# Patient Record
Sex: FEMALE | URBAN - METROPOLITAN AREA
[De-identification: names, ages, dates, MRNs, and addresses within clinical notes are randomized per-mention and may not be internally consistent; named-entity substitution may affect disease eponyms.]

---

## 2024-02-13 ENCOUNTER — NURSING HOME VISIT (OUTPATIENT)
Dept: POST ACUTE CARE | Facility: EXTERNAL LOCATION | Age: 89
End: 2024-02-13
Payer: MEDICARE

## 2024-02-13 DIAGNOSIS — G30.9 ALZHEIMER'S DISEASE (MULTI): ICD-10-CM

## 2024-02-13 DIAGNOSIS — M62.81 MUSCLE WEAKNESS: ICD-10-CM

## 2024-02-13 DIAGNOSIS — F02.80 ALZHEIMER'S DISEASE (MULTI): ICD-10-CM

## 2024-02-13 DIAGNOSIS — S72.001D FRACTURE OF UNSPECIFIED PART OF NECK OF RIGHT FEMUR, SUBSEQUENT ENCOUNTER FOR CLOSED FRACTURE WITH ROUTINE HEALING: Primary | ICD-10-CM

## 2024-02-13 PROCEDURE — 99308 SBSQ NF CARE LOW MDM 20: CPT | Performed by: REGISTERED NURSE

## 2024-02-13 NOTE — LETTER
Patient: Austin Hernandez  : 1935    Encounter Date: 2024    PROGRESS NOTE    Subjective  Chief complaint: Austin Hernandez is a 88 y.o. female who is an acute skilled patient being seen and evaluated for weakness    HPI:  24 Patient admitted to SNF for therapy d/t weakness after recent hospitalization.   Patient requires assist with ADLs and transfers.  No new complaints.        Objective  Vital signs: 136/80, 97.3, 71, 18, 98%    Physical Exam  Constitutional:       General: She is not in acute distress.  Eyes:      Extraocular Movements: Extraocular movements intact.   Pulmonary:      Effort: Pulmonary effort is normal.   Musculoskeletal:      Cervical back: Neck supple.   Neurological:      Mental Status: She is alert.   Psychiatric:         Mood and Affect: Mood normal.         Behavior: Behavior is cooperative.         Assessment/Plan  Problem List Items Addressed This Visit       Fracture of unspecified part of neck of right femur, subsequent encounter for closed fracture with routine healing - Primary     Pt/ot   Pain mgmt          Muscle weakness     Pt/ot          Alzheimer's disease (CMS/HCC)     Mentation at baseline   Monitor   Supportive care           Medications, treatments, and labs reviewed  Continue medications and treatments as listed in [facility]    Scribe Attestation  I, Guicho Redman   attest that this documentation has been prepared under the direction and in the presence of BEV Barajas.    Provider Attestation - Scribe documentation  All medical record entries made by the Scribe were at my direction and personally dictated by me. I have reviewed the chart and agree that the record accurately reflects my personal performance of the history, physical exam, discussion and plan.    BEV Barajas        Electronically Signed By: BEV Barajas   24  7:12 PM

## 2024-02-14 NOTE — PROGRESS NOTES
PROGRESS NOTE    Subjective   Chief complaint: Austin Hernandez is a 88 y.o. female who is an acute skilled patient being seen and evaluated for weakness    HPI:  2/13/24 Patient admitted to SNF for therapy d/t weakness after recent hospitalization.   Patient requires assist with ADLs and transfers.  No new complaints.        Objective   Vital signs: 136/80, 97.3, 71, 18, 98%    Physical Exam  Constitutional:       General: She is not in acute distress.  Eyes:      Extraocular Movements: Extraocular movements intact.   Pulmonary:      Effort: Pulmonary effort is normal.   Musculoskeletal:      Cervical back: Neck supple.   Neurological:      Mental Status: She is alert.   Psychiatric:         Mood and Affect: Mood normal.         Behavior: Behavior is cooperative.         Assessment/Plan   Problem List Items Addressed This Visit       Fracture of unspecified part of neck of right femur, subsequent encounter for closed fracture with routine healing - Primary     Pt/ot   Pain mgmt          Muscle weakness     Pt/ot          Alzheimer's disease (CMS/[facility])     Mentation at baseline   Monitor   Supportive care           Medications, treatments, and labs reviewed  Continue medications and treatments as listed in [facility]    Scribe Attestation  I, Guicho Redman   attest that this documentation has been prepared under the direction and in the presence of BEV Barajas.    Provider Attestation - Scribe documentation  All medical record entries made by the Scribe were at my direction and personally dictated by me. I have reviewed the chart and agree that the record accurately reflects my personal performance of the history, physical exam, discussion and plan.    BEV Barajas

## 2024-02-27 PROBLEM — S72.001D FRACTURE OF UNSPECIFIED PART OF NECK OF RIGHT FEMUR, SUBSEQUENT ENCOUNTER FOR CLOSED FRACTURE WITH ROUTINE HEALING: Status: ACTIVE | Noted: 2024-02-27

## 2024-02-27 PROBLEM — F02.80 ALZHEIMER'S DISEASE (MULTI): Status: ACTIVE | Noted: 2024-02-27

## 2024-02-27 PROBLEM — G30.9 ALZHEIMER'S DISEASE (MULTI): Status: ACTIVE | Noted: 2024-02-27

## 2024-02-27 PROBLEM — M62.81 MUSCLE WEAKNESS: Status: ACTIVE | Noted: 2024-02-27

## 2024-02-28 ENCOUNTER — NURSING HOME VISIT (OUTPATIENT)
Dept: POST ACUTE CARE | Facility: EXTERNAL LOCATION | Age: 89
End: 2024-02-28
Payer: MEDICARE

## 2024-02-28 DIAGNOSIS — I10 PRIMARY HYPERTENSION: ICD-10-CM

## 2024-02-28 DIAGNOSIS — G30.9 ALZHEIMER'S DISEASE (MULTI): Primary | ICD-10-CM

## 2024-02-28 DIAGNOSIS — M62.81 MUSCLE WEAKNESS: ICD-10-CM

## 2024-02-28 DIAGNOSIS — M62.838 MUSCLE SPASM: ICD-10-CM

## 2024-02-28 DIAGNOSIS — S72.001D FRACTURE OF UNSPECIFIED PART OF NECK OF RIGHT FEMUR, SUBSEQUENT ENCOUNTER FOR CLOSED FRACTURE WITH ROUTINE HEALING: ICD-10-CM

## 2024-02-28 DIAGNOSIS — F02.80 ALZHEIMER'S DISEASE (MULTI): Primary | ICD-10-CM

## 2024-02-28 PROCEDURE — 99309 SBSQ NF CARE MODERATE MDM 30: CPT | Performed by: REGISTERED NURSE

## 2024-02-28 NOTE — LETTER
Patient: Austin Hernandez  : 1935    Encounter Date: 2024    PROGRESS NOTE    Subjective  Chief complaint: Austin Hernandez is a 88 y.o. female patient being seen and evaluated for monthly general medical care and follow-up    HPI:  24 Patient presents for general medical care and f/u.  Patient seen and examined at bedside.  No issues per nursing.  Patient has no acute complaints.          Objective  Vital signs: 136/80, 97.3, 71, 18, 98%    Physical Exam  Constitutional:       General: She is not in acute distress.  Eyes:      Extraocular Movements: Extraocular movements intact.   Pulmonary:      Effort: Pulmonary effort is normal.   Musculoskeletal:      Cervical back: Neck supple.   Neurological:      Mental Status: She is alert.   Psychiatric:         Mood and Affect: Mood normal.         Behavior: Behavior is cooperative.         Assessment/Plan  Problem List Items Addressed This Visit       Fracture of unspecified part of neck of right femur, subsequent encounter for closed fracture with routine healing     Encourage patient to ask for assistance  Pain mgmt          Muscle weakness     Encourage patient to ask for assistance         Alzheimer's disease (CMS/[facility]) - Primary     Mentation at baseline   Monitor   Supportive care          Muscle spasm     Cyclobenzaprine         Hypertension     Nifedipine          Medications, treatments, and labs reviewed  Continue medications and treatments as listed in EMR    Scribe Attestation  IRadha Scribe   attest that this documentation has been prepared under the direction and in the presence of BEV Barajas.    Provider Attestation - Scribe documentation  All medical record entries made by the Scribe were at my direction and personally dictated by me. I have reviewed the chart and agree that the record accurately reflects my personal performance of the history, physical exam, discussion and plan.    Shola Linn  APRN-CNP      Electronically Signed By: BEV Barajas   3/21/24 10:04 AM

## 2024-02-29 NOTE — PROGRESS NOTES
PROGRESS NOTE    Subjective   Chief complaint: Austin Hernandez is a 88 y.o. female patient being seen and evaluated for monthly general medical care and follow-up    HPI:  2/28/24 Patient presents for general medical care and f/u.  Patient seen and examined at bedside.  No issues per nursing.  Patient has no acute complaints.          Objective   Vital signs: 136/80, 97.3, 71, 18, 98%    Physical Exam  Constitutional:       General: She is not in acute distress.  Eyes:      Extraocular Movements: Extraocular movements intact.   Pulmonary:      Effort: Pulmonary effort is normal.   Musculoskeletal:      Cervical back: Neck supple.   Neurological:      Mental Status: She is alert.   Psychiatric:         Mood and Affect: Mood normal.         Behavior: Behavior is cooperative.         Assessment/Plan   Problem List Items Addressed This Visit       Fracture of unspecified part of neck of right femur, subsequent encounter for closed fracture with routine healing     Encourage patient to ask for assistance  Pain mgmt          Muscle weakness     Encourage patient to ask for assistance         Alzheimer's disease (CMS/[facility]) - Primary     Mentation at baseline   Monitor   Supportive care          Muscle spasm     Cyclobenzaprine         Hypertension     Nifedipine          Medications, treatments, and labs reviewed  Continue medications and treatments as listed in EMR    Scribe Attestation  I, Guicho Redman   attest that this documentation has been prepared under the direction and in the presence of BEV Barajas.    Provider Attestation - Scribe documentation  All medical record entries made by the Scribe were at my direction and personally dictated by me. I have reviewed the chart and agree that the record accurately reflects my personal performance of the history, physical exam, discussion and plan.    BEV Barajas

## 2024-03-21 PROBLEM — M62.838 MUSCLE SPASM: Status: ACTIVE | Noted: 2024-03-21

## 2024-03-21 PROBLEM — I10 HYPERTENSION: Status: ACTIVE | Noted: 2024-03-21

## 2024-03-26 ENCOUNTER — NURSING HOME VISIT (OUTPATIENT)
Dept: POST ACUTE CARE | Facility: EXTERNAL LOCATION | Age: 89
End: 2024-03-26
Payer: MEDICARE

## 2024-03-26 DIAGNOSIS — G30.9 ALZHEIMER'S DISEASE (MULTI): ICD-10-CM

## 2024-03-26 DIAGNOSIS — F02.80 ALZHEIMER'S DISEASE (MULTI): ICD-10-CM

## 2024-03-26 DIAGNOSIS — I10 PRIMARY HYPERTENSION: ICD-10-CM

## 2024-03-26 DIAGNOSIS — M62.838 MUSCLE SPASM: Primary | ICD-10-CM

## 2024-03-26 DIAGNOSIS — S72.001D FRACTURE OF UNSPECIFIED PART OF NECK OF RIGHT FEMUR, SUBSEQUENT ENCOUNTER FOR CLOSED FRACTURE WITH ROUTINE HEALING: ICD-10-CM

## 2024-03-26 PROCEDURE — 99309 SBSQ NF CARE MODERATE MDM 30: CPT | Performed by: REGISTERED NURSE

## 2024-03-26 NOTE — LETTER
Patient: Austin Hernandez  : 1935    Encounter Date: 2024    PROGRESS NOTE    Subjective  Chief complaint: Austin Hernandez is a 88 y.o. female patient being seen and evaluated for monthly general medical care and follow-up    HPI:  3/26/24 Patient presents for general medical care and f/u.  Patient seen and examined at bedside.  No issues per nursing.  Patient has no acute complaints.          Objective  Vital signs: 136/80, 97.3, 71, 18, 98%    Physical Exam  Constitutional:       General: She is not in acute distress.  Eyes:      Extraocular Movements: Extraocular movements intact.   Pulmonary:      Effort: Pulmonary effort is normal.   Musculoskeletal:      Cervical back: Neck supple.   Neurological:      Mental Status: She is alert.   Psychiatric:         Mood and Affect: Mood normal.         Behavior: Behavior is cooperative.         Assessment/Plan  Problem List Items Addressed This Visit       Fracture of unspecified part of neck of right femur, subsequent encounter for closed fracture with routine healing     Encourage patient to ask for assistance  Pain mgmt          Alzheimer's disease (Multi)     Mentation at baseline   Monitor   Supportive care          Muscle spasm - Primary     Cyclobenzaprine         Hypertension     Nifedipine          Medications, treatments, and labs reviewed  Continue medications and treatments as listed in EMR    Scribe Attestation  IRadha Scribe   attest that this documentation has been prepared under the direction and in the presence of BEV Barajas.    Provider Attestation - Scribe documentation  All medical record entries made by the Scribe were at my direction and personally dictated by me. I have reviewed the chart and agree that the record accurately reflects my personal performance of the history, physical exam, discussion and plan.    BEV Barajas      Electronically Signed By: Shola Linn  TSERING-CNP   4/18/24  1:24 PM

## 2024-03-27 NOTE — PROGRESS NOTES
PROGRESS NOTE    Subjective   Chief complaint: Austin Hernandez is a 88 y.o. female patient being seen and evaluated for monthly general medical care and follow-up    HPI:  3/26/24 Patient presents for general medical care and f/u.  Patient seen and examined at bedside.  No issues per nursing.  Patient has no acute complaints.          Objective   Vital signs: 136/80, 97.3, 71, 18, 98%    Physical Exam  Constitutional:       General: She is not in acute distress.  Eyes:      Extraocular Movements: Extraocular movements intact.   Pulmonary:      Effort: Pulmonary effort is normal.   Musculoskeletal:      Cervical back: Neck supple.   Neurological:      Mental Status: She is alert.   Psychiatric:         Mood and Affect: Mood normal.         Behavior: Behavior is cooperative.         Assessment/Plan   Problem List Items Addressed This Visit       Fracture of unspecified part of neck of right femur, subsequent encounter for closed fracture with routine healing     Encourage patient to ask for assistance  Pain mgmt          Alzheimer's disease (Multi)     Mentation at baseline   Monitor   Supportive care          Muscle spasm - Primary     Cyclobenzaprine         Hypertension     Nifedipine          Medications, treatments, and labs reviewed  Continue medications and treatments as listed in EMR    Scribe Attestation  IRadha Scribe   attest that this documentation has been prepared under the direction and in the presence of BEV Barajas.    Provider Attestation - Scribe documentation  All medical record entries made by the Scribe were at my direction and personally dictated by me. I have reviewed the chart and agree that the record accurately reflects my personal performance of the history, physical exam, discussion and plan.    BEV Barajas

## 2024-04-12 ENCOUNTER — NURSING HOME VISIT (OUTPATIENT)
Dept: POST ACUTE CARE | Facility: EXTERNAL LOCATION | Age: 89
End: 2024-04-12
Payer: MEDICARE

## 2024-04-12 DIAGNOSIS — F02.80 ALZHEIMER'S DISEASE (MULTI): Primary | ICD-10-CM

## 2024-04-12 DIAGNOSIS — G30.9 ALZHEIMER'S DISEASE (MULTI): Primary | ICD-10-CM

## 2024-04-12 PROCEDURE — 99308 SBSQ NF CARE LOW MDM 20: CPT | Performed by: REGISTERED NURSE

## 2024-04-12 NOTE — LETTER
Patient: Austin Hernandez  : 1935    Encounter Date: 2024    PROGRESS NOTE    Subjective  Chief complaint: Austin Hernandez is a 88 y.o. female who is a long term care patient being seen and evaluated for paperwork.    HPI:  HPI pt seen today for paperwork that needed completion for facility   Objective  Vital signs: 136/80, 97.3, 71, 18, 98%    Physical Exam  Constitutional:       General: She is not in acute distress.  Eyes:      Extraocular Movements: Extraocular movements intact.   Pulmonary:      Effort: Pulmonary effort is normal.   Musculoskeletal:      Cervical back: Neck supple.   Neurological:      Mental Status: She is alert.   Psychiatric:         Mood and Affect: Mood normal.         Behavior: Behavior is cooperative.       Assessment/Plan  Problem List Items Addressed This Visit       Alzheimer's disease (Multi) - Primary     Mentation at baseline   Monitor   Supportive care     Paperwork completed spent aprx 15 min to complete          Medications, treatments, and labs reviewed  Continue medications and treatments as listed in [facility]    Scribe Attestation  IRadha Scribe   attest that this documentation has been prepared under the direction and in the presence of BEV Barajas.    Provider Attestation - Scribe documentation  All medical record entries made by the Scribe were at my direction and personally dictated by me. I have reviewed the chart and agree that the record accurately reflects my personal performance of the history, physical exam, discussion and plan.    BEV Barajas          Electronically Signed By: BEV Barajas   24  5:14 PM

## 2024-04-15 NOTE — PROGRESS NOTES
PROGRESS NOTE    Subjective   Chief complaint: Austin Hernandez is a 88 y.o. female who is a long term care patient being seen and evaluated for paperwork.    HPI:  HPI pt seen today for paperwork that needed completion for facility   Objective   Vital signs: 136/80, 97.3, 71, 18, 98%    Physical Exam  Constitutional:       General: She is not in acute distress.  Eyes:      Extraocular Movements: Extraocular movements intact.   Pulmonary:      Effort: Pulmonary effort is normal.   Musculoskeletal:      Cervical back: Neck supple.   Neurological:      Mental Status: She is alert.   Psychiatric:         Mood and Affect: Mood normal.         Behavior: Behavior is cooperative.       Assessment/Plan   Problem List Items Addressed This Visit       Alzheimer's disease (Multi) - Primary     Mentation at baseline   Monitor   Supportive care     Paperwork completed spent aprx 15 min to complete          Medications, treatments, and labs reviewed  Continue medications and treatments as listed in [facility]    Scribe Attestation  Radha HERRERA Scribe   attest that this documentation has been prepared under the direction and in the presence of BEV Barajas.    Provider Attestation - Scribe documentation  All medical record entries made by the Scribe were at my direction and personally dictated by me. I have reviewed the chart and agree that the record accurately reflects my personal performance of the history, physical exam, discussion and plan.    BEV Barajas

## 2024-04-22 ENCOUNTER — NURSING HOME VISIT (OUTPATIENT)
Dept: POST ACUTE CARE | Facility: EXTERNAL LOCATION | Age: 89
End: 2024-04-22
Payer: MEDICARE

## 2024-04-22 DIAGNOSIS — F02.80 ALZHEIMER'S DISEASE (MULTI): Primary | ICD-10-CM

## 2024-04-22 DIAGNOSIS — E78.2 MIXED HYPERLIPIDEMIA: ICD-10-CM

## 2024-04-22 DIAGNOSIS — I10 PRIMARY HYPERTENSION: ICD-10-CM

## 2024-04-22 DIAGNOSIS — K59.01 SLOW TRANSIT CONSTIPATION: ICD-10-CM

## 2024-04-22 DIAGNOSIS — G30.9 ALZHEIMER'S DISEASE (MULTI): Primary | ICD-10-CM

## 2024-04-22 PROCEDURE — 99308 SBSQ NF CARE LOW MDM 20: CPT

## 2024-04-22 NOTE — LETTER
Patient: Austin Hernandez  : 1935    Encounter Date: 2024    PROGRESS NOTE    Subjective  Chief complaint: Austin Hernandez is a 88 y.o. female who is a long term care patient being seen and evaluated for medical care follow up.    HPI:  HPI  Is for general medical care and follow-up.  Patient seen and examined at bedside.  Patient.  Patient has no acute complaints at this time.  Denies N/V/F/C.    Objective  Vital signs: 0.3, 136/80, 71, 98% room air.    Physical Exam  Vitals reviewed.   Constitutional:       General: She is not in acute distress.  Eyes:      Extraocular Movements: Extraocular movements intact.   Cardiovascular:      Rate and Rhythm: Normal rate and regular rhythm.   Pulmonary:      Effort: Pulmonary effort is normal.      Breath sounds: Normal breath sounds.   Abdominal:      General: Bowel sounds are normal.      Palpations: Abdomen is soft.   Musculoskeletal:      Cervical back: Neck supple.      Right lower leg: No edema.      Left lower leg: No edema.   Neurological:      Mental Status: She is alert.   Psychiatric:         Mood and Affect: Mood normal.         Behavior: Behavior is cooperative.         Assessment/Plan  Problem List Items Addressed This Visit       Alzheimer's disease (Multi) - Primary     Mentation at baseline   Divalproex  Supportive care          Hypertension     Well controlled  HCTz  Nifedipine           Slow transit constipation     Dulcolax  Milk of Magnesia           Mixed hyperlipidemia     Medications, treatments, and labs reviewed  Continue medications and treatments as listed in EMR    BEV Echeverria      Electronically Signed By: BEV Echeverria   24  8:50 PM

## 2024-04-23 PROBLEM — E78.2 MIXED HYPERLIPIDEMIA: Status: ACTIVE | Noted: 2024-04-23

## 2024-04-23 PROBLEM — K59.01 SLOW TRANSIT CONSTIPATION: Status: ACTIVE | Noted: 2024-04-23

## 2024-04-23 PROBLEM — S72.001D FRACTURE OF UNSPECIFIED PART OF NECK OF RIGHT FEMUR, SUBSEQUENT ENCOUNTER FOR CLOSED FRACTURE WITH ROUTINE HEALING: Status: RESOLVED | Noted: 2024-02-27 | Resolved: 2024-04-23

## 2024-04-24 NOTE — PROGRESS NOTES
PROGRESS NOTE    Subjective   Chief complaint: Austin Hernandez is a 88 y.o. female who is a long term care patient being seen and evaluated for medical care follow up.    HPI:  HPI  Is for general medical care and follow-up.  Patient seen and examined at bedside.  Patient.  Patient has no acute complaints at this time.  Denies N/V/F/C.    Objective   Vital signs: 0.3, 136/80, 71, 98% room air.    Physical Exam  Vitals reviewed.   Constitutional:       General: She is not in acute distress.  Eyes:      Extraocular Movements: Extraocular movements intact.   Cardiovascular:      Rate and Rhythm: Normal rate and regular rhythm.   Pulmonary:      Effort: Pulmonary effort is normal.      Breath sounds: Normal breath sounds.   Abdominal:      General: Bowel sounds are normal.      Palpations: Abdomen is soft.   Musculoskeletal:      Cervical back: Neck supple.      Right lower leg: No edema.      Left lower leg: No edema.   Neurological:      Mental Status: She is alert.   Psychiatric:         Mood and Affect: Mood normal.         Behavior: Behavior is cooperative.         Assessment/Plan   Problem List Items Addressed This Visit       Alzheimer's disease (Multi) - Primary     Mentation at baseline   Divalproex  Supportive care          Hypertension     Well controlled  HCTz  Nifedipine           Slow transit constipation     Dulcolax  Milk of Magnesia           Mixed hyperlipidemia     Medications, treatments, and labs reviewed  Continue medications and treatments as listed in EMR    Esha Callejas, TSERING-CNP

## 2024-05-07 NOTE — ASSESSMENT & PLAN NOTE
Mentation at baseline   Monitor   Supportive care     Paperwork completed spent aprx 15 min to complete

## 2024-05-17 ENCOUNTER — NURSING HOME VISIT (OUTPATIENT)
Dept: POST ACUTE CARE | Facility: EXTERNAL LOCATION | Age: 89
End: 2024-05-17
Payer: MEDICARE

## 2024-05-17 DIAGNOSIS — M62.81 MUSCLE WEAKNESS: Primary | ICD-10-CM

## 2024-05-17 DIAGNOSIS — G30.9 ALZHEIMER'S DISEASE (MULTI): ICD-10-CM

## 2024-05-17 DIAGNOSIS — M62.838 MUSCLE SPASM: ICD-10-CM

## 2024-05-17 DIAGNOSIS — F02.80 ALZHEIMER'S DISEASE (MULTI): ICD-10-CM

## 2024-05-17 DIAGNOSIS — I10 PRIMARY HYPERTENSION: ICD-10-CM

## 2024-05-17 PROCEDURE — 99309 SBSQ NF CARE MODERATE MDM 30: CPT | Performed by: REGISTERED NURSE

## 2024-05-17 NOTE — LETTER
Patient: Austin Hernandez  : 1935    Encounter Date: 2024    PROGRESS NOTE    Subjective  Chief complaint: Austin Hernandez is a 89 y.o. female patient being seen and evaluated for monthly general medical care and follow-up    HPI:  24 Patient presents for general medical care and f/u.  Patient seen and examined at bedside.  No issues per nursing.  Patient has no acute complaints.        Objective  Vital signs: 136/80, 97.3, 71, 18, 98%    Physical Exam  Constitutional:       General: She is not in acute distress.  Eyes:      Extraocular Movements: Extraocular movements intact.   Pulmonary:      Effort: Pulmonary effort is normal.   Musculoskeletal:      Cervical back: Neck supple.   Neurological:      Mental Status: She is alert.   Psychiatric:         Mood and Affect: Mood normal.         Behavior: Behavior is cooperative.         Assessment/Plan  Problem List Items Addressed This Visit       Muscle weakness - Primary     Encourage patient to ask for assistance         Alzheimer's disease (Multi)     Mentation at baseline   Monitor   Supportive care              Muscle spasm     Cyclobenzaprine         Hypertension     Nifedipine          Medications, treatments, and labs reviewed  Continue medications and treatments as listed in EMR    Scribe Attestation  IRadha Scribe   attest that this documentation has been prepared under the direction and in the presence of BEV Barajas.    Provider Attestation - Scribe documentation  All medical record entries made by the Scribe were at my direction and personally dictated by me. I have reviewed the chart and agree that the record accurately reflects my personal performance of the history, physical exam, discussion and plan.    BEV Barajas      Electronically Signed By: BEV Barajas   24  2:38 PM

## 2024-05-20 NOTE — PROGRESS NOTES
PROGRESS NOTE    Subjective   Chief complaint: Austin Hernandez is a 89 y.o. female patient being seen and evaluated for monthly general medical care and follow-up    HPI:  5/17/24 Patient presents for general medical care and f/u.  Patient seen and examined at bedside.  No issues per nursing.  Patient has no acute complaints.        Objective   Vital signs: 136/80, 97.3, 71, 18, 98%    Physical Exam  Constitutional:       General: She is not in acute distress.  Eyes:      Extraocular Movements: Extraocular movements intact.   Pulmonary:      Effort: Pulmonary effort is normal.   Musculoskeletal:      Cervical back: Neck supple.   Neurological:      Mental Status: She is alert.   Psychiatric:         Mood and Affect: Mood normal.         Behavior: Behavior is cooperative.         Assessment/Plan   Problem List Items Addressed This Visit       Muscle weakness - Primary     Encourage patient to ask for assistance         Alzheimer's disease (Multi)     Mentation at baseline   Monitor   Supportive care              Muscle spasm     Cyclobenzaprine         Hypertension     Nifedipine          Medications, treatments, and labs reviewed  Continue medications and treatments as listed in EMR    Scribe Attestation  IRadha Scribe   attest that this documentation has been prepared under the direction and in the presence of BEV Barajas.    Provider Attestation - Scribe documentation  All medical record entries made by the Scribe were at my direction and personally dictated by me. I have reviewed the chart and agree that the record accurately reflects my personal performance of the history, physical exam, discussion and plan.    BEV Barajas     Initial (On Arrival)

## 2024-09-25 ENCOUNTER — NURSING HOME VISIT (OUTPATIENT)
Dept: POST ACUTE CARE | Facility: EXTERNAL LOCATION | Age: 89
End: 2024-09-25
Payer: COMMERCIAL

## 2024-09-25 DIAGNOSIS — G30.9 ALZHEIMER'S DISEASE: Primary | ICD-10-CM

## 2024-09-25 DIAGNOSIS — M62.838 MUSCLE SPASM: ICD-10-CM

## 2024-09-25 DIAGNOSIS — I10 PRIMARY HYPERTENSION: ICD-10-CM

## 2024-09-25 DIAGNOSIS — M62.81 MUSCLE WEAKNESS: ICD-10-CM

## 2024-09-25 DIAGNOSIS — F02.80 ALZHEIMER'S DISEASE: Primary | ICD-10-CM

## 2024-09-25 PROCEDURE — 99309 SBSQ NF CARE MODERATE MDM 30: CPT | Performed by: REGISTERED NURSE

## 2024-09-25 NOTE — LETTER
Patient: Austin Hernandez  : 1935    Encounter Date: 2024    PROGRESS NOTE    Subjective  Chief complaint: Austin Hernandez is a 89 y.o. female patient being seen and evaluated for monthly general medical care and follow-up    HPI:  Patient presents for general medical care and f/u.  Patient seen and examined at bedside.  No issues per nursing.  Patient has no acute complaints.        Objective  Vital signs: 98, 74, 16, 99%    Physical Exam  Constitutional:       General: She is not in acute distress.  Eyes:      Extraocular Movements: Extraocular movements intact.   Pulmonary:      Effort: Pulmonary effort is normal.   Musculoskeletal:      Cervical back: Neck supple.   Neurological:      Mental Status: She is alert.   Psychiatric:         Mood and Affect: Mood normal.         Behavior: Behavior is cooperative.         Assessment/Plan  Problem List Items Addressed This Visit       Muscle weakness     Encourage patient to ask for assistance         Alzheimer's disease - Primary     Mentation at baseline   Monitor   Supportive care              Muscle spasm     Cyclobenzaprine         Hypertension     Nifedipine          Medications, treatments, and labs reviewed  Continue medications and treatments as listed in EMR    Scribe Attestation  I, Guicho Redman   attest that this documentation has been prepared under the direction and in the presence of BEV Barajas.    Provider Attestation - Scribe documentation  All medical record entries made by the Scribe were at my direction and personally dictated by me. I have reviewed the chart and agree that the record accurately reflects my personal performance of the history, physical exam, discussion and plan.    BEV Barajas      Electronically Signed By: BEV Barajas   10/2/24  4:27 PM

## 2024-10-30 ENCOUNTER — NURSING HOME VISIT (OUTPATIENT)
Dept: POST ACUTE CARE | Facility: EXTERNAL LOCATION | Age: 89
End: 2024-10-30
Payer: COMMERCIAL

## 2024-10-30 DIAGNOSIS — I10 PRIMARY HYPERTENSION: ICD-10-CM

## 2024-10-30 DIAGNOSIS — G30.9 ALZHEIMER'S DISEASE: Primary | ICD-10-CM

## 2024-10-30 DIAGNOSIS — E78.2 MIXED HYPERLIPIDEMIA: ICD-10-CM

## 2024-10-30 DIAGNOSIS — M62.838 MUSCLE SPASM: ICD-10-CM

## 2024-10-30 DIAGNOSIS — F02.80 ALZHEIMER'S DISEASE: Primary | ICD-10-CM

## 2024-10-30 PROCEDURE — 99309 SBSQ NF CARE MODERATE MDM 30: CPT | Performed by: INTERNAL MEDICINE

## 2024-10-30 NOTE — LETTER
Patient: Austin Hernandez  : 1935    Encounter Date: 10/30/2024    PROGRESS NOTE    Subjective  Chief complaint: Austin Hernandez is a 89 y.o. female patient being seen and evaluated for monthly general medical care and follow-up     HPI:  Patient is here for general medical care and his multi follow-up.  He has a past medical history of Alzheimer's dementia.  He is his normal self per nursing.  No issues.  He has a past medical history of hypertension.  His blood pressure is controlled for his age.  No chest pain or headaches.  He has a past medical history of hyperlipidemia.  Recent labs reviewed.  He is tolerating his statin.          Objective      Physical Exam  HENT:      Head: Normocephalic and atraumatic.      Nose: Nose normal.      Mouth/Throat:      Mouth: Mucous membranes are moist.      Pharynx: Oropharynx is clear.   Eyes:      Extraocular Movements: Extraocular movements intact.      Pupils: Pupils are equal, round, and reactive to light.   Cardiovascular:      Rate and Rhythm: Normal rate and regular rhythm.   Pulmonary:      Effort: No respiratory distress.      Breath sounds: No wheezing, rhonchi or rales.   Abdominal:      General: Bowel sounds are normal. There is no distension.      Palpations: Abdomen is soft.      Tenderness: There is no abdominal tenderness. There is no guarding.   Musculoskeletal:      Right lower leg: No edema.      Left lower leg: No edema.   Skin:     General: Skin is warm and dry.   Neurological:      Mental Status: She is alert. Mental status is at baseline.         Assessment/Plan  Problem List Items Addressed This Visit       Alzheimer's disease - Primary     Supportive care  Continue current medications         Muscle spasm     Continue current medications         Hypertension     Continue current medications         Mixed hyperlipidemia     Continue current medications          Medications, treatments, and labs reviewed  Continue medications and treatments as  listed in EMR    Shola Lane DO    Scribe Attestation  IAnaibashley   attest that this documentation has been prepared under the direction and in the presence of Shola Lane DO    Provider Attestation - Scribe documentation  All medical record entries made by the Scribe were at my direction and personally dictated by me. I have reviewed the chart and agree that the record accurately reflects my personal performance of the history, physical exam, discussion and plan.      Electronically Signed By: Shola Lane DO   11/6/24  8:13 PM

## 2024-10-31 NOTE — PROGRESS NOTES
PROGRESS NOTE    Subjective   Chief complaint: Austin Hernandez is a 89 y.o. female patient being seen and evaluated for monthly general medical care and follow-up     HPI:  Patient is here for general medical care and his multi follow-up.  He has a past medical history of Alzheimer's dementia.  He is his normal self per nursing.  No issues.  He has a past medical history of hypertension.  His blood pressure is controlled for his age.  No chest pain or headaches.  He has a past medical history of hyperlipidemia.  Recent labs reviewed.  He is tolerating his statin.          Objective       Physical Exam  HENT:      Head: Normocephalic and atraumatic.      Nose: Nose normal.      Mouth/Throat:      Mouth: Mucous membranes are moist.      Pharynx: Oropharynx is clear.   Eyes:      Extraocular Movements: Extraocular movements intact.      Pupils: Pupils are equal, round, and reactive to light.   Cardiovascular:      Rate and Rhythm: Normal rate and regular rhythm.   Pulmonary:      Effort: No respiratory distress.      Breath sounds: No wheezing, rhonchi or rales.   Abdominal:      General: Bowel sounds are normal. There is no distension.      Palpations: Abdomen is soft.      Tenderness: There is no abdominal tenderness. There is no guarding.   Musculoskeletal:      Right lower leg: No edema.      Left lower leg: No edema.   Skin:     General: Skin is warm and dry.   Neurological:      Mental Status: She is alert. Mental status is at baseline.         Assessment/Plan   Problem List Items Addressed This Visit       Alzheimer's disease - Primary     Supportive care  Continue current medications         Muscle spasm     Continue current medications         Hypertension     Continue current medications         Mixed hyperlipidemia     Continue current medications          Medications, treatments, and labs reviewed  Continue medications and treatments as listed in EMR    DO Guicho Beatty Attestation  I,  Ana Lindo   attest that this documentation has been prepared under the direction and in the presence of Shola Lane, DO    Provider Attestation - Scribe documentation  All medical record entries made by the Scribe were at my direction and personally dictated by me. I have reviewed the chart and agree that the record accurately reflects my personal performance of the history, physical exam, discussion and plan.

## 2024-11-26 ENCOUNTER — NURSING HOME VISIT (OUTPATIENT)
Dept: POST ACUTE CARE | Facility: EXTERNAL LOCATION | Age: 89
End: 2024-11-26
Payer: COMMERCIAL

## 2024-11-26 DIAGNOSIS — G30.9 ALZHEIMER'S DISEASE: ICD-10-CM

## 2024-11-26 DIAGNOSIS — F02.80 ALZHEIMER'S DISEASE: ICD-10-CM

## 2024-11-26 DIAGNOSIS — M62.81 MUSCLE WEAKNESS: Primary | ICD-10-CM

## 2024-11-26 DIAGNOSIS — E78.2 MIXED HYPERLIPIDEMIA: ICD-10-CM

## 2024-11-26 DIAGNOSIS — I10 PRIMARY HYPERTENSION: ICD-10-CM

## 2024-11-26 PROCEDURE — 99309 SBSQ NF CARE MODERATE MDM 30: CPT | Performed by: REGISTERED NURSE

## 2024-11-26 NOTE — LETTER
Patient: Austin Hernandez  : 1935    Encounter Date: 2024    PROGRESS NOTE    Subjective  Chief complaint: Austin Hernandez is a 89 y.o. female patient being seen and evaluated for monthly general medical care and follow-up    HPI:  Patient presents for general medical care and f/u. Patient seen and examined at bedside. No issues per nursing. Patient has no acute complaints.        Objective  Vital signs: 186/94, 98, 74, 16, 99%    Physical Exam  Constitutional:       General: She is not in acute distress.  Eyes:      Extraocular Movements: Extraocular movements intact.   Pulmonary:      Effort: Pulmonary effort is normal.   Musculoskeletal:      Cervical back: Neck supple.   Neurological:      Mental Status: She is alert.   Psychiatric:         Mood and Affect: Mood normal.         Behavior: Behavior is cooperative.         Assessment/Plan  Problem List Items Addressed This Visit       Muscle weakness - Primary     Encourage patient to ask for assistance         Alzheimer's disease     Mentation at baseline   Monitor   Supportive care              Hypertension     Nifedipine         Mixed hyperlipidemia     Continue current medications          Medications, treatments, and labs reviewed  Continue medications and treatments as listed in EMR    Scribe Attestation  I, Guicho Redman   attest that this documentation has been prepared under the direction and in the presence of BEV Barajas.    Provider Attestation - Scribe documentation  All medical record entries made by the Scribe were at my direction and personally dictated by me. I have reviewed the chart and agree that the record accurately reflects my personal performance of the history, physical exam, discussion and plan.    BEV Barajas      Electronically Signed By: BEV Barajas   12/3/24  5:53 PM

## 2024-11-27 NOTE — PROGRESS NOTES
PROGRESS NOTE    Subjective   Chief complaint: Austin Hernandez is a 89 y.o. female patient being seen and evaluated for monthly general medical care and follow-up    HPI:  Patient presents for general medical care and f/u. Patient seen and examined at bedside. No issues per nursing. Patient has no acute complaints.        Objective   Vital signs: 186/94, 98, 74, 16, 99%    Physical Exam  Constitutional:       General: She is not in acute distress.  Eyes:      Extraocular Movements: Extraocular movements intact.   Pulmonary:      Effort: Pulmonary effort is normal.   Musculoskeletal:      Cervical back: Neck supple.   Neurological:      Mental Status: She is alert.   Psychiatric:         Mood and Affect: Mood normal.         Behavior: Behavior is cooperative.         Assessment/Plan   Problem List Items Addressed This Visit       Muscle weakness - Primary     Encourage patient to ask for assistance         Alzheimer's disease     Mentation at baseline   Monitor   Supportive care              Hypertension     Nifedipine         Mixed hyperlipidemia     Continue current medications          Medications, treatments, and labs reviewed  Continue medications and treatments as listed in EMR    Scribe Attestation  IRadha Scribe   attest that this documentation has been prepared under the direction and in the presence of BEV Barajas.    Provider Attestation - Scribe documentation  All medical record entries made by the Scribe were at my direction and personally dictated by me. I have reviewed the chart and agree that the record accurately reflects my personal performance of the history, physical exam, discussion and plan.    BEV Barajas

## 2024-12-23 ENCOUNTER — NURSING HOME VISIT (OUTPATIENT)
Dept: POST ACUTE CARE | Facility: EXTERNAL LOCATION | Age: 89
End: 2024-12-23
Payer: COMMERCIAL

## 2024-12-23 DIAGNOSIS — M62.81 MUSCLE WEAKNESS: ICD-10-CM

## 2024-12-23 DIAGNOSIS — G30.9 ALZHEIMER'S DISEASE: Primary | ICD-10-CM

## 2024-12-23 DIAGNOSIS — I10 PRIMARY HYPERTENSION: ICD-10-CM

## 2024-12-23 DIAGNOSIS — M62.838 MUSCLE SPASM: ICD-10-CM

## 2024-12-23 DIAGNOSIS — F02.80 ALZHEIMER'S DISEASE: Primary | ICD-10-CM

## 2024-12-23 PROCEDURE — 99309 SBSQ NF CARE MODERATE MDM 30: CPT | Performed by: REGISTERED NURSE

## 2024-12-23 NOTE — LETTER
Patient: Austin Hernandez  : 1935    Encounter Date: 2024    PROGRESS NOTE    Subjective  Chief complaint: Austin Hernandez is a 89 y.o. female patient being seen and evaluated for monthly general medical care and follow-up    HPI:  Patient presents for general medical care and f/u.  Patient seen and examined at bedside.  No issues per nursing.  Patient has no acute complaints.        Objective  Vital signs: 136/80, 97.3, 71, 18, 98%    Physical Exam  Constitutional:       General: She is not in acute distress.  Eyes:      Extraocular Movements: Extraocular movements intact.   Pulmonary:      Effort: Pulmonary effort is normal.   Musculoskeletal:      Cervical back: Neck supple.   Neurological:      Mental Status: She is alert.   Psychiatric:         Mood and Affect: Mood normal.         Behavior: Behavior is cooperative.         Assessment/Plan  Problem List Items Addressed This Visit       Muscle weakness     Encourage patient to ask for assistance         Alzheimer's disease - Primary     Mentation at baseline   Monitor   Supportive care              Muscle spasm     Continue current medications         Hypertension     Nifedipine          Medications, treatments, and labs reviewed  Continue medications and treatments as listed in EMR    Scribe Attestation  I, Guicho Redman   attest that this documentation has been prepared under the direction and in the presence of BEV Barajas.    Provider Attestation - Scribe documentation  All medical record entries made by the Scribe were at my direction and personally dictated by me. I have reviewed the chart and agree that the record accurately reflects my personal performance of the history, physical exam, discussion and plan.    BEV Barajas      Electronically Signed By: BEV Barajas   24 10:32 PM

## 2024-12-24 NOTE — PROGRESS NOTES
PROGRESS NOTE    Subjective   Chief complaint: Austin Hernandez is a 89 y.o. female patient being seen and evaluated for monthly general medical care and follow-up    HPI:  Patient presents for general medical care and f/u.  Patient seen and examined at bedside.  No issues per nursing.  Patient has no acute complaints.        Objective   Vital signs: 136/80, 97.3, 71, 18, 98%    Physical Exam  Constitutional:       General: She is not in acute distress.  Eyes:      Extraocular Movements: Extraocular movements intact.   Pulmonary:      Effort: Pulmonary effort is normal.   Musculoskeletal:      Cervical back: Neck supple.   Neurological:      Mental Status: She is alert.   Psychiatric:         Mood and Affect: Mood normal.         Behavior: Behavior is cooperative.         Assessment/Plan   Problem List Items Addressed This Visit       Muscle weakness     Encourage patient to ask for assistance         Alzheimer's disease - Primary     Mentation at baseline   Monitor   Supportive care              Muscle spasm     Continue current medications         Hypertension     Nifedipine          Medications, treatments, and labs reviewed  Continue medications and treatments as listed in EMR    Scribe Attestation  IRadha Scribe   attest that this documentation has been prepared under the direction and in the presence of BEV Barajas.    Provider Attestation - Scribe documentation  All medical record entries made by the Scribe were at my direction and personally dictated by me. I have reviewed the chart and agree that the record accurately reflects my personal performance of the history, physical exam, discussion and plan.    BEV Barajas